# Patient Record
Sex: FEMALE | Race: WHITE | NOT HISPANIC OR LATINO | Employment: PART TIME | RURAL
[De-identification: names, ages, dates, MRNs, and addresses within clinical notes are randomized per-mention and may not be internally consistent; named-entity substitution may affect disease eponyms.]

---

## 2024-04-29 ENCOUNTER — HOSPITAL ENCOUNTER (OUTPATIENT)
Dept: RADIOLOGY | Facility: HOSPITAL | Age: 24
Discharge: HOME | End: 2024-04-29
Attending: ORTHOPAEDIC SURGERY
Payer: COMMERCIAL

## 2024-04-29 ENCOUNTER — OFFICE VISIT (OUTPATIENT)
Dept: ORTHOPEDICS | Facility: CLINIC | Age: 24
End: 2024-04-29
Payer: COMMERCIAL

## 2024-04-29 VITALS — HEIGHT: 62 IN | WEIGHT: 165 LBS | BODY MASS INDEX: 30.36 KG/M2

## 2024-04-29 DIAGNOSIS — S93.492A SPRAIN OF ANTERIOR TALOFIBULAR LIGAMENT OF LEFT ANKLE, INITIAL ENCOUNTER: ICD-10-CM

## 2024-04-29 DIAGNOSIS — M25.572 ACUTE LEFT ANKLE PAIN: Primary | ICD-10-CM

## 2024-04-29 DIAGNOSIS — M25.572 ACUTE LEFT ANKLE PAIN: ICD-10-CM

## 2024-04-29 PROCEDURE — 73610 X-RAY EXAM OF ANKLE: CPT | Mod: TC,LT

## 2024-04-29 PROCEDURE — 73610 X-RAY EXAM OF ANKLE: CPT | Mod: 26,LT,, | Performed by: ORTHOPAEDIC SURGERY

## 2024-04-29 PROCEDURE — 1160F RVW MEDS BY RX/DR IN RCRD: CPT | Mod: ,,, | Performed by: ORTHOPAEDIC SURGERY

## 2024-04-29 PROCEDURE — 3008F BODY MASS INDEX DOCD: CPT | Mod: ,,, | Performed by: ORTHOPAEDIC SURGERY

## 2024-04-29 PROCEDURE — 1159F MED LIST DOCD IN RCRD: CPT | Mod: ,,, | Performed by: ORTHOPAEDIC SURGERY

## 2024-04-29 PROCEDURE — 99213 OFFICE O/P EST LOW 20 MIN: CPT | Mod: PBBFAC,25 | Performed by: ORTHOPAEDIC SURGERY

## 2024-04-29 PROCEDURE — 99204 OFFICE O/P NEW MOD 45 MIN: CPT | Mod: S$PBB,,, | Performed by: ORTHOPAEDIC SURGERY

## 2024-04-29 RX ORDER — ELAGOLIX 200 MG/1
1 TABLET, FILM COATED ORAL 2 TIMES DAILY
COMMUNITY
Start: 2024-04-03

## 2024-04-29 NOTE — PROGRESS NOTES
CLINIC NOTE       Chief Complaint   Patient presents with    Left Ankle - Injury, Pain        Chuck Carcamo is a 24 y.o. female seen today for evaluation of her left ankle injury.  She was reportedly sustained an inversion twisting type injured her left ankle yesterday when she stepped down from her porch steps.  Additionally she was stepped in a hole a aggravating the situation.  She however has continued to ambulate independently.    Past Medical History:   Diagnosis Date    Anxiety     Depression     Depression     Eczema      Family History   Problem Relation Name Age of Onset    Thyroid disease Mother      Thyroid disease Maternal Grandmother      Glaucoma Maternal Grandmother      Anxiety disorder Maternal Grandmother      Hearing loss Maternal Grandmother      Alzheimer's disease Paternal Grandmother       Current Outpatient Medications on File Prior to Visit   Medication Sig Dispense Refill    ORILISSA 200 mg Tab Take 1 tablet by mouth 2 (two) times daily.      [DISCONTINUED] azithromycin (ZITHROMAX) 500 MG tablet Take 1 tablet (500 mg total) by mouth once daily. 7 tablet 0    [DISCONTINUED] busPIRone (BUSPAR) 10 MG tablet Take 1 tablet (10 mg total) by mouth 3 (three) times daily as needed (anxiety). 60 tablet 2    [DISCONTINUED] citalopram (CELEXA) 10 MG tablet Take 1 tablet (10 mg total) by mouth once daily. 30 tablet 11    [DISCONTINUED] etonogestreL-ethinyl estradioL (NUVARING) 0.12-0.015 mg/24 hr vaginal ring INSERT 1 RING INTRAVAGINALLY EVERY 4 WEEKS FOR 21 DAYS 1 each 11    [DISCONTINUED] fenoprofen (NALFON) 400 mg Cap Take 400 mg by mouth 3 (three) times daily. (Patient not taking: Reported on 2/9/2023) 60 capsule 5    [DISCONTINUED] ketoconazole (NIZORAL) 2 % shampoo Apply topically twice a week. 240 mL 5    [DISCONTINUED] melatonin 5 mg Chew Take 1 capsule by mouth once daily.      [DISCONTINUED] triamcinolone acetonide 0.1% (KENALOG) 0.1 % cream Apply topically 2 (two) times daily. 454 g  5     No current facility-administered medications on file prior to visit.       ROS     There were no vitals filed for this visit.    Past Surgical History:   Procedure Laterality Date    WISDOM TOOTH EXTRACTION          Review of patient's allergies indicates:  No Known Allergies     Ortho Exam :  Well-developed well-nourished  female no acute distress.  She is alert oriented cooperative.  Left ankle and foot is normal contour.  No significant soft tissue swelling or ecchymosis.  There is tenderness palpation along the course of the anterior talofibular ligament.  Anterior drawer test symmetrical.    Radiographic Examination:  Left ankle 04/29/2024    Technique:  Three views AP lateral and mortise    Findings:  Bones well mineralized.  Ankle mortise normally aligned.  No evidence of fracture, dislocation or pathologic bone.    Impression:   See Above    Assessment and Plan  Patient Active Problem List    Diagnosis Date Noted    Laryngitis 03/31/2023    Flexural eczema 02/09/2023    Anxiety     Depression     Dysmenorrhea     Impression:  Lateral ankle sprain-left lower extremity   Plan:  1. RI CE 2.  Cam walker boot issued 3. Slow progression of weight-bearing activities outlined.  David Mcwilliams M.D.

## 2024-04-29 NOTE — LETTER
April 29, 2024      [facility]  [address] [facility] [address]  Phone: [phone]  Fax: [phone]       Patient: Chuck Carcamo   YOB: 2000  Date of Visit: 04/29/2024    To Whom It May Concern:    USHA Carcamo  was at [facility] on 04/29/2024. The patient may return to work/school on 05/01/2024 with no restrictions. If you have any questions or concerns, or if I can be of further assistance, please do not hesitate to contact me.    Sincerely,  Dr.Pomierski Ana Paula Vásquez MA

## 2024-10-02 ENCOUNTER — PATIENT OUTREACH (OUTPATIENT)
Facility: HOSPITAL | Age: 24
End: 2024-10-02
Payer: COMMERCIAL

## 2024-10-02 NOTE — PROGRESS NOTES
Population Health Chart Review & Patient Outreach Details    Updates Requested / Reviewed:    [x]  Care Everywhere Updated & Reviewed  [x]  [facility] & [facility] Reviewed  [x]   Reviewed      Health Maintenance Topics Addressed and Outreach Outcomes / Actions Taken:  Chlamydia Screening  [x] No documentation found in [facility], [facility], or Care Everywhere for Chlamydia Screening.      [x] Comment placed in chart that patient needs this. No upcoming appointment scheduled at this time.

## 2025-02-03 ENCOUNTER — PATIENT OUTREACH (OUTPATIENT)
Facility: HOSPITAL | Age: 25
End: 2025-02-03
Payer: COMMERCIAL

## 2025-02-03 NOTE — PROGRESS NOTES
02/03/2025   --Chart accessed for: Care Gaps  --Care Gaps addressed: pap smear  Outreach made to patient via [employer] . (Success) (Left Message) (Unavailable)   Care Everywhere updates requested and reviewed.  [facility] and [facility] reviewed.  Health Maintenance Due   Topic Date Due    HPV Vaccines (2 - 2-dose series) 11/17/2014    TETANUS VACCINE  Never done    Pneumococcal Vaccines (Age 0-49) (1 of 2 - PCV) Never done    Chlamydia Screening  12/09/2022    Influenza Vaccine (1) 09/01/2024    COVID-19 Vaccine (1 - 2024-25 season) Never done    Pap Smear  12/09/2024

## 2025-02-13 ENCOUNTER — PATIENT OUTREACH (OUTPATIENT)
Facility: HOSPITAL | Age: 25
End: 2025-02-13
Payer: COMMERCIAL

## 2025-02-13 NOTE — PROGRESS NOTES
Population Health Chart Review & Patient Outreach Details    Updates Requested / Reviewed:  [x]  Care Team Updated  [x]  Care Everywhere Updated & Reviewed  [x]  [facility] & [facility] Reviewed  [x]   Reviewed      Health Maintenance Topics Addressed and Outreach Outcomes / Actions Taken:  Chlamydia Screening  [x] No documentation found in [facility], [facility], or Care Everywhere for Chlamydia Screening.      [x] Care everywhere shows that pt has been seeing Dr. Vamsi Bedolla. Patient has not seen an [facility] provided since 2023. Dr. Bedolla added as PCP.

## 2025-06-27 ENCOUNTER — TELEPHONE (OUTPATIENT)
Dept: ORTHOPEDICS | Facility: CLINIC | Age: 25
End: 2025-06-27
Payer: COMMERCIAL

## 2025-06-27 NOTE — TELEPHONE ENCOUNTER
Copied from CRM #[id number]. Topic: Appointments - Appointment Scheduling  >> Jun 27, 2025  8:34 AM Sloan wrote:  Who Called: Montana M Nelson    Caller is requesting a same day appointment. Caller declined first available appointment listed below.      When is the first available appointment? 6/30  Options offered (Virtual Visit, Urgent Care): no  Symptoms or reason for appointment:  pt fell off porch last night and hurt left ankle she can not walk said she can not make it through the weekend requesting to be seen today if possible       Preferred Method of Contact: Phone Call  Patient's Preferred Phone Number on File: [phone]   Best Call Back Number, if different:  Additional Information: